# Patient Record
Sex: FEMALE | URBAN - METROPOLITAN AREA
[De-identification: names, ages, dates, MRNs, and addresses within clinical notes are randomized per-mention and may not be internally consistent; named-entity substitution may affect disease eponyms.]

---

## 2021-06-14 ENCOUNTER — APPOINTMENT (OUTPATIENT)
Dept: URBAN - METROPOLITAN AREA CLINIC 234 | Age: 41
Setting detail: DERMATOLOGY
End: 2021-06-14

## 2021-06-14 VITALS — WEIGHT: 125 LBS | HEIGHT: 64 IN

## 2021-06-14 DIAGNOSIS — L65.8 OTHER SPECIFIED NONSCARRING HAIR LOSS: ICD-10-CM

## 2021-06-14 PROCEDURE — OTHER RECOMMENDATIONS: OTHER

## 2021-06-14 PROCEDURE — OTHER PRESCRIPTION: OTHER

## 2021-06-14 PROCEDURE — OTHER ORDER TESTS: OTHER

## 2021-06-14 PROCEDURE — OTHER COUNSELING: OTHER

## 2021-06-14 RX ORDER — SPIRONOLACTONE 25 MG/1
TABLET, FILM COATED ORAL
Qty: 30 | Refills: 2 | Status: ERX | COMMUNITY
Start: 2021-06-14

## 2021-06-14 ASSESSMENT — LOCATION DETAILED DESCRIPTION DERM
LOCATION DETAILED: RIGHT MEDIAL FOREHEAD
LOCATION DETAILED: SUPERIOR MID FOREHEAD

## 2021-06-14 ASSESSMENT — LOCATION ZONE DERM: LOCATION ZONE: FACE

## 2021-06-14 ASSESSMENT — LOCATION SIMPLE DESCRIPTION DERM
LOCATION SIMPLE: RIGHT FOREHEAD
LOCATION SIMPLE: SUPERIOR FOREHEAD

## 2021-06-14 NOTE — PROCEDURE: RECOMMENDATIONS
Recommendation Preamble: The following recommendations were made during the visit:
Render Risk Assessment In Note?: no
Recommendations (Free Text): Rogaine
Detail Level: Zone

## 2021-06-14 NOTE — HPI: HAIR LOSS
Previous Labs: Yes
How Did The Hair Loss Occur?: gradual in onset
How Severe Is Your Hair Loss?: mild
When Were The Labs Drawn? (Drawn...): 3 months ago

## 2021-07-19 ENCOUNTER — APPOINTMENT (OUTPATIENT)
Dept: URBAN - METROPOLITAN AREA CLINIC 234 | Age: 41
Setting detail: DERMATOLOGY
End: 2021-07-19

## 2021-07-19 VITALS — HEIGHT: 64 IN | WEIGHT: 132 LBS

## 2021-07-19 DIAGNOSIS — L65.8 OTHER SPECIFIED NONSCARRING HAIR LOSS: ICD-10-CM

## 2021-07-19 PROCEDURE — OTHER COUNSELING: OTHER

## 2021-07-19 PROCEDURE — 99212 OFFICE O/P EST SF 10 MIN: CPT

## 2021-07-19 PROCEDURE — OTHER PRESCRIPTION: OTHER

## 2021-07-19 RX ORDER — SPIRONOLACTONE 25 MG/1
TABLET, FILM COATED ORAL
Qty: 30 | Refills: 11 | Status: ERX | COMMUNITY
Start: 2021-07-19

## 2021-07-19 ASSESSMENT — LOCATION ZONE DERM: LOCATION ZONE: SCALP

## 2021-07-19 ASSESSMENT — LOCATION DETAILED DESCRIPTION DERM: LOCATION DETAILED: MEDIAL FRONTAL SCALP

## 2021-07-19 ASSESSMENT — LOCATION SIMPLE DESCRIPTION DERM: LOCATION SIMPLE: FRONTAL SCALP

## 2022-07-11 ENCOUNTER — APPOINTMENT (OUTPATIENT)
Dept: URBAN - METROPOLITAN AREA CLINIC 234 | Age: 42
Setting detail: DERMATOLOGY
End: 2022-07-11

## 2022-07-11 VITALS — HEIGHT: 64 IN | WEIGHT: 140 LBS

## 2022-07-11 DIAGNOSIS — L65.8 OTHER SPECIFIED NONSCARRING HAIR LOSS: ICD-10-CM

## 2022-07-11 PROCEDURE — OTHER ADDITIONAL NOTES: OTHER

## 2022-07-11 PROCEDURE — OTHER COUNSELING: OTHER

## 2022-07-11 PROCEDURE — OTHER MIPS QUALITY: OTHER

## 2022-07-11 PROCEDURE — OTHER PRESCRIPTION: OTHER

## 2022-07-11 PROCEDURE — 99213 OFFICE O/P EST LOW 20 MIN: CPT

## 2022-07-11 RX ORDER — SPIRONOLACTONE 50 MG/1
TABLET, FILM COATED ORAL
Qty: 180 | Refills: 3 | Status: ERX | COMMUNITY
Start: 2022-07-11

## 2022-07-11 RX ORDER — MINOXIDIL 2.5 MG/1
TABLET ORAL
Qty: 45 | Refills: 3 | Status: ERX | COMMUNITY
Start: 2022-07-11

## 2022-07-11 ASSESSMENT — LOCATION SIMPLE DESCRIPTION DERM: LOCATION SIMPLE: RIGHT FOREHEAD

## 2022-07-11 ASSESSMENT — LOCATION ZONE DERM: LOCATION ZONE: FACE

## 2022-07-11 ASSESSMENT — LOCATION DETAILED DESCRIPTION DERM: LOCATION DETAILED: RIGHT SUPERIOR MEDIAL FOREHEAD

## 2022-07-18 RX ORDER — SPIRONOLACTONE 50 MG/1
TABLET, FILM COATED ORAL
Qty: 180 | Refills: 3 | Status: ERX

## 2022-07-18 RX ORDER — MINOXIDIL 2.5 MG/1
TABLET ORAL
Qty: 45 | Refills: 3 | Status: ERX